# Patient Record
Sex: FEMALE | Race: WHITE | Employment: FULL TIME | URBAN - METROPOLITAN AREA
[De-identification: names, ages, dates, MRNs, and addresses within clinical notes are randomized per-mention and may not be internally consistent; named-entity substitution may affect disease eponyms.]

---

## 2019-05-13 ENCOUNTER — OFFICE VISIT (OUTPATIENT)
Dept: ORTHOPEDIC SURGERY | Age: 34
End: 2019-05-13
Payer: COMMERCIAL

## 2019-05-13 VITALS — WEIGHT: 185 LBS | HEIGHT: 66 IN | BODY MASS INDEX: 29.73 KG/M2

## 2019-05-13 DIAGNOSIS — M25.561 ACUTE PAIN OF RIGHT KNEE: ICD-10-CM

## 2019-05-13 DIAGNOSIS — M25.562 ACUTE PAIN OF BOTH KNEES: Primary | ICD-10-CM

## 2019-05-13 DIAGNOSIS — M25.561 ACUTE PAIN OF BOTH KNEES: Primary | ICD-10-CM

## 2019-05-13 PROCEDURE — 99204 OFFICE O/P NEW MOD 45 MIN: CPT | Performed by: ORTHOPAEDIC SURGERY

## 2019-05-13 NOTE — PROGRESS NOTES
5/13/19  History of Present Illness:                             Naa Wright is a 35 y.o. female 1st time evaluation bilateral knee pain right worse than left      Chief complaint presented in the office today: Bilateral knee pain right greater than left    Location bilateral knees  Severity moderate at least 6 out of 10  Duration more than a year  Associated sign/symptoms pain, catching, locking,    I have reviewed and discussed the below Pain assessment findings with the patient. Medical History  Patient's medications, allergies, past medical, surgical, social and family histories were reviewed and updated as appropriate. History reviewed. No pertinent past medical history. History reviewed. No pertinent family history.   Social History     Socioeconomic History    Marital status: None     Spouse name: None    Number of children: None    Years of education: None    Highest education level: None   Occupational History    None   Social Needs    Financial resource strain: None    Food insecurity:     Worry: None     Inability: None    Transportation needs:     Medical: None     Non-medical: None   Tobacco Use    Smoking status: Never Smoker    Smokeless tobacco: Never Used   Substance and Sexual Activity    Alcohol use: None    Drug use: None    Sexual activity: None   Lifestyle    Physical activity:     Days per week: None     Minutes per session: None    Stress: None   Relationships    Social connections:     Talks on phone: None     Gets together: None     Attends [employer] service: None     Active member of club or organization: None     Attends meetings of clubs or organizations: None     Relationship status: None    Intimate partner violence:     Fear of current or ex partner: None     Emotionally abused: None     Physically abused: None     Forced sexual activity: None   Other Topics Concern    None   Social History Narrative    None     No current outpatient medications on file. No current facility-administered medications for this visit. No Known Allergies    REVIEW OF SYSTEMS:   No acute rash  No numbness  No tingling  No fevers  No depression    Pertinent items are noted in HPI  Review of systems reviewed from Patient History Form dated on 5/13/19 and available in the patient's chart under the Media tab. Examination:  General Exam:    Vitals: Height 5' 6\" (1.676 m), weight 185 lb (83.9 kg). Constitutional: Patient is adequately groomed with no evidence of malnutrition  Mental Status: The patient is oriented to time, place and person. The patient's mood and affect are appropriate. Lymphatic: The lymphatic examination bilaterally reveals all areas to be without enlargement or induration. Vascular: Examination reveals no swelling or calf tenderness. Peripheral pulses are palpable and 2+. Neurological: The patient has good coordination. There is no weakness or sensory deficit. Skin:    Head/Neck: inspection reveals no rashes, ulcerations or lesions. Trunk:  inspection reveals no rashes, ulcerations or lesions. Right Lower Extremity: inspection reveals no rashes, ulcerations or lesions. Left Lower Extremity: inspection reveals no rashes, ulcerations or lesions. PHYSICAL EXAM:      Knee Examination  Inspection:  No abrasions no lacerations no signs of infection or obvious deformity no obvious  swelling and joint effusion     Palpation:   Palpation reveals moderate  Pain along the medial joint line,   No lateral pain along the joint line ,  mild joint effusion noted today.     Range of Motion:  0 - 150° flexion/ extension   Hip extension to 20 hip flexion to 70+  Lumbar ROM -20 extension flexion to 6 inches from the floor      Strength: Quadriceps testing 5/5 , hamstring muscle testing 5/5, EHL against resistance is 5/5, hip flexor strength is intact 5/5, internal and external rotation of the hip against resistance is also intact 5/5    Special Tests: stable Lachman, negative anterior drawer, negative pivot shift, no posterior sag no posterior drawer does not open to valgus or varus stress at 0 or 30° positive, Steinmann's positive, Gil's negative, Homans negative Oumar, pedal pulses are +2/4 capillary refill is brisk sensation is intact ankle exam and hip exam are shows no pain with full range of motion provocative testing of the hip is nonpainful muscle testing around the hip is 5 over 5. Lumbar flexion to 6 inches from floor with out pain    Gait: antalgic     Reflex:    Lower extremity Deep tendon reflexes +2/4 and equal bilaterally for patella and Achilles  Upper extremity reflexes:  of the biceps, triceps, brachioradialis +2/4 equal bilaterally    Contralateral  Knee: Negative Lachman negative anterior drawer negative pivot shift no posterior sag no posterior drawer does not open to valgus or varus stress at 0 or 30° negative Steinmann's negative Gil's negative Homans negative Oumar pedal pulses are +2/4 capillary refill is brisk sensation is intact ankle exam and hip exam are shows no pain with full range of motion provocative testing of the hip is nonpainful muscle testing around the hip is 5 over 5. Lumbar exam:    L1: good strength of the iliopsoas muscle upper lateral thigh sensation is intact  L2: good strength of the iliopsoas muscle and medial epicondyle sensation is intact Lateral thigh sensation is intact  L3: good strength with out pain of obturator externus muscle sensation is intact to medial epicondyle of the femur   L4:Good quadratus strength and gluteus medius and minimus strength, sensation is intact to medial malleolus  L5:Intact Extensor hallucis and tibialis posterior strength, sensation is intact to dorsum of the foot.    S1:  Plantar foot sensation is intact  S2:  Posterior thigh and calf sensation is intact      Hip and lumbar testing does not reproduce pain provocative testing does not reproduce the symptomatology. Additional Examinations:  Right Upper Extremity:  Examination of the right upper extremity does not show any tenderness, deformity or injury. Range of motion is unremarkable. There is no gross instability. There are no rashes, ulcerations or lesions. Strength and tone are normal.  Left Upper Extremity: Examination of the left upper extremity does not show any tenderness, deformity or injury. Range of motion is unremarkable. There is no gross instability. There are no rashes, ulcerations or lesions. Strength and tone are normal.  Lower Back: Examination of the lower back does not show any tenderness, deformity or injury. Range of motion is unremarkable. There is no gross instability. There are no rashes, ulcerations or lesions. Strength and tone are normal.          IMPRESSION:    Diagnostic testing:  X-rays reviewed in office, I independently reviewed the films in the office today:  I reviewed multiple X-rays of the bilateral knee today, anterior posterior, lateral, notch view, skyline view:  Show no fracture no dislocation no signs of any significant arthritic wear, good joint space maintenance, no masses or tumors, no signs of any significant osteopenia, no obvious OCD lesions, no loose bodies seen. Impression no fracture no dislocation no signs of any significant articular wear  MRI: Ordered a right knee MRI to rule out meniscus tear today  Labs: None ordered      History reviewed. No pertinent surgical history. .    Office Procedures:  Orders Placed This Encounter   Procedures    XR KNEE RIGHT (MIN 4 VIEWS)     Standing Status:   Future     Number of Occurrences:   1     Standing Expiration Date:   5/13/2020    XR KNEE LEFT (MIN 4 VIEWS)     Standing Status:   Future     Number of Occurrences:   1     Standing Expiration Date:   5/13/2020       Previous Treatments:  Ice, rest, physical therapy, injections,, X-ray,

## 2019-05-20 ENCOUNTER — OFFICE VISIT (OUTPATIENT)
Dept: ORTHOPEDIC SURGERY | Age: 34
End: 2019-05-20
Payer: COMMERCIAL

## 2019-05-20 VITALS — WEIGHT: 185 LBS | HEIGHT: 66 IN | BODY MASS INDEX: 29.73 KG/M2

## 2019-05-20 DIAGNOSIS — M25.561 ACUTE PAIN OF RIGHT KNEE: Primary | ICD-10-CM

## 2019-05-20 PROCEDURE — 99214 OFFICE O/P EST MOD 30 MIN: CPT | Performed by: ORTHOPAEDIC SURGERY

## 2019-05-20 RX ORDER — PREDNISONE 10 MG/1
TABLET ORAL
Qty: 30 TABLET | Refills: 0 | Status: SHIPPED | OUTPATIENT
Start: 2019-05-20

## 2019-05-20 NOTE — PROGRESS NOTES
5/20/19  History of Present Illness:  Ulises Horan is a 35 y.o. female    Chief complaint today in the office:  Recheck evaluation right knee here today to discuss options    Location right Knee   Severity moderate  Duration several months  Associated sign/symptoms pain, swelling, trouble walking without pain    Medical History  Patient's medications, allergies, past medical, surgical, social and family histories were reviewed and updated as appropriate. Review of Systems  No new rashes  No numbness  No tingling  No fever  No depression  No new pain pattern  Pertinent items are noted in HPI  Review of systems reviewed from Patient History Form dated on 5/13/19 and available in the patient's chart under the Media tab. No change in the patients medical history form. Examination:  General Exam:    Vitals: Height 5' 6\" (1.676 m), weight 185 lb (83.9 kg). Constitutional: Patient is adequately groomed with no evidence of malnutrition  Mental Status: The patient is alert and  oriented to time, place and person. The patient's mood and affect are appropriate. Lymphatic: The lymphatic examination bilaterally reveals all areas to be without enlargement or induration. Vascular: Examination reveals no swelling or calf tenderness. Peripheral pulses are palpable and 2+. Neurological: The patient has good coordination. There is no weakness or sensory deficit. Skin:    Head/Neck: inspection reveals no rashes, ulcerations or lesions. Trunk:  inspection reveals no rashes, ulcerations or lesions. Right Lower Extremity: inspection reveals no rashes, ulcerations or lesions. Left Lower Extremity: inspection reveals no rashes, ulcerations or lesions.                                           PHYSICAL EXAM:        Knee Examination  Inspection:  No abrasions no lacerations no signs of infection or obvious deformity no obvious  swelling and joint effusion     Palpation:   Palpation reveals no  Pain medial joint line,   Moderate lateral joint line pain,  mild joint effusion    Range of Motion:  0 - 150° flexion/ extension   Hip extension to 20 hip flexion to 70+  Lumbar ROM -20 extension flexion to 6 inches from the floor      Strength: Quadriceps testing 5/5 , hamstring muscle testing 5/5, EHL against resistance is 5/5, hip flexor strength is intact 5/5, internal and external rotation of the hip against resistance is also intact 5/5    Special Tests: stable Lachman, negative anterior drawer, negative pivot shift, no posterior sag no posterior drawer does not open to valgus or varus stress at 0 or 30° negative, Steinmann's negative, Gil's negative, Homans negative Oumar, pedal pulses are +2/4 capillary refill is brisk sensation is intact ankle exam and hip exam are shows no pain with full range of motion provocative testing of the hip is nonpainful muscle testing around the hip is 5 over 5. Lumbar flexion to 6 inches from floor with out pain lateral retinacular crepitus with lateral retinacular tightness      Inspection:      Gait: antalgic     Reflex:    Lower extremity Deep tendon reflexes +2/4 and equal bilaterally for patella and Achilles  Upper extremity reflexes:  of the biceps, triceps, brachioradialis +2/4 equal bilaterally    Contralateral  Knee: Negative Lachman negative anterior drawer negative pivot shift no posterior sag no posterior drawer does not open to valgus or varus stress at 0 or 30° negative Steinmann's negative Gil's negative Homans negative Oumar pedal pulses are +2/4 capillary refill is brisk sensation is intact ankle exam and hip exam are shows no pain with full range of motion provocative testing of the hip is nonpainful muscle testing around the hip is 5 over 5. Hip and lumbar testing does not reproduce pain evocative testing does not reproduce symptomatology.           Additional Examinations:  Left Lower Extremity: Examination of the left lower extremity does not show any tenderness, deformity or injury. Range of motion is unremarkable. There is no gross instability. There are no rashes, ulcerations or lesions. Strength and tone are normal.  Right Upper Extremity:  Examination of the right upper extremity does not show any tenderness, deformity or injury. Range of motion is unremarkable. There is no gross instability. There are no rashes, ulcerations or lesions. Strength and tone are normal.  Left Upper Extremity: Examination of the left upper extremity does not show any tenderness, deformity or injury. Range of motion is unremarkable. There is no gross instability. There are no rashes, ulcerations or lesions. Strength and tone are normal.  Lower Back: Examination of the lower back does not show any tenderness, deformity or injury. Range of motion is unremarkable. There is no gross instability. There are no rashes, ulcerations or lesions. Strength and tone are normal.    History reviewed. No pertinent surgical history. .    Radiology:     X-rays reviewed in office:  I independently reviewed the films in the office today. Views right multiple views  Body Part right knee  Impression right knee lateral facet patella ulcer      Assessment :  Right knee lateral facet patella ulcer    Impression: Same    Office Procedures:  No orders of the defined types were placed in this encounter. Previous Treatments:  X-ray, MRI,    Possible other diagnoses:      Treatment Plan:  Prednisone, physical therapy, follow-up if no better we'll consider injection or a lateral retinacular release      Tate Benitez. SKIP Mahoney. [address] [place] [address] 20 and Sports Medicine  Sports Fellowship Trained  Board Certified  [facility] Team Physician        Disclaimer: \"This note was dictated with voice recognition software. Though review and correction are routine, we apologize for any errors. \"

## 2019-05-29 ENCOUNTER — HOSPITAL ENCOUNTER (OUTPATIENT)
Dept: PHYSICAL THERAPY | Age: 34
Setting detail: THERAPIES SERIES
Discharge: HOME | End: 2019-05-29
Payer: COMMERCIAL

## 2019-05-29 PROCEDURE — 97161 PT EVAL LOW COMPLEX 20 MIN: CPT

## 2019-05-29 PROCEDURE — 97110 THERAPEUTIC EXERCISES: CPT

## 2019-05-29 NOTE — FLOWSHEET NOTE
[facility]    Physical Therapy Daily Treatment Note  Date:  2019    Patient Name:  Julius Rios    :  1985  MRN: [id number]  Restrictions/Precautions:    Medical/Treatment Diagnosis Information:  Diagnosis: M25.561 (R knee pain)   Treatment Diagnosis: R knee pain; L knee pain   Insurance/Certification information:  PT Insurance Information: [employer]/3000 deductible/60 visits  Physician Information:  Referring Practitioner: Dr. Nicholson Blocker of care signed (Y/N):     Date of Patient follow up with Physician:     G-Code (if applicable):      Date G-Code Applied:     LEFS 21%    Progress Note: [x]  Yes  []  No  Next due by: Visit #10       Latex Allergy:  [x]NO      []YES  Preferred Language for Healthcare:   [x]English       []other:    Visit # Insurance Allowable   1 60     Pain level:  8/10 at its worst     SUBJECTIVE:  See eval    OBJECTIVE: See eval  Observation:   Test measurements:      RESTRICTIONS/PRECAUTIONS: B knee pain     Exercises/Interventions:     Therapeutic Ex Resistance Sets/sec Reps Notes   Retro Stepper/BIKE       Quad sets  x25     3 way SLR Flex/abd 2 10 B   Bridges  2 10 5 sec hold   Clam ABD       Hip Ext /table       Bosu fwd/side lunge       Slide Lunge       Leg Press Ecc 0-       Cybex HS curl       TKE       Glute side walks       BOSU squat                            Manual Intervention       Knee mobs/PROM       Tib/Fem Mobs       Patella Mobs       Ankle mobs                     NMR re-education       British/Biofeedback 10/10       G. Med activaiton/sidelying       G. Max Activation/prone       Hip Ext full ROM G.  Activation       Bosu Bal and Prop- G Med       Single leg stance/Balance/Prop       Bosu Retro G. Med act                         Therapeutic Exercise and NMR EXR  [x] (64701) Provided verbal/tactile cueing for activities related to strengthening, flexibility, endurance, ROM for improvements Total Treatment Minutes: 40 min   Restricted benefits (high deductible)   [x] EVAL  [x] ZW(41503) x  1   [] IONTO  [] NMR (84139) x      [] VASO  [] Manual (05189) x       [] Other:  [] TA x       [] Mech Traction (51661)  [] ES(attended) (50741)      [] ES (un) (44015):     GOALS:   Patient stated goal: \"no pain\" \"unrestricted hiking\"     Therapist goals for Patient:   Short Term Goals: To be achieved in: 2 weeks  1. Independent in HEP and progression per patient tolerance, in order to prevent re-injury. 2. Patient will have a decrease in pain to facilitate improvement in movement, function, and ADLs as indicated by Functional Deficits. Long Term Goals: To be achieved in: 8 weeks  1. Disability index score of 15% or less for the LEFS to assist with reaching prior level of function. 2. Patient will demonstrate an increase in Strength to good proximal hip strength and control, within 5lb HHD in LE to allow for proper functional mobility as indicated by patients Functional Deficits. 3. Patient will return to all functional activities without increased symptoms or restriction. 4. Pt will squat pain free to perform work duties. Progression Towards Functional goals:  [] Patient is progressing as expected towards functional goals listed. [] Progression is slowed due to complexities listed. [] Progression has been slowed due to co-morbidities. [x] Plan just implemented, too soon to assess goals progression  [] Other:     ASSESSMENT:  See eval    Treatment/Activity Tolerance:  [x] Patient tolerated treatment well [] Patient limited by fatique  [] Patient limited by pain  [] Patient limited by other medical complications  [] Other:     Prognosis: [x] Good [] Fair  [] Poor    Patient Requires Follow-up: [x] Yes  [] No    PLAN: See eval  [] Continue per plan of care [] Alter current plan (see comments)  [x] Plan of care initiated [] Hold pending MD visit [] Discharge    Electronically signed by:  See ARCHIE Sauceda PT

## 2019-05-29 NOTE — PLAN OF CARE
[facility]  [address] [place] [address]  Phone [phone]   Fax [phone]                                                       Physical Therapy Certification    Dear Referring Practitioner: Dr. Karo Chang,    We had the pleasure of evaluating the following patient for physical therapy services at [address] [place] [address]. A summary of our findings can be found in the initial assessment below. This includes our plan of care. If you have any questions or concerns regarding these findings, please do not hesitate to contact me at the office phone number checked above. Thank you for the referral.       Physician Signature:_______________________________Date:__________________  By signing above (or electronic signature), therapists plan is approved by physician      Patient: Heike Her   : 1985   MRN: [id number]  Referring Physician: Referring Practitioner: Dr. Karo Chang      Evaluation Date: 2019      Medical Diagnosis Information:  Diagnosis: M25.561 (R knee pain)    Treatment Diagnosis: R knee pain; L knee pain                                          Insurance information: PT Insurance Information: [employer]/3000 deductible/60 visits     Precautions/ Contra-indications: None   Latex Allergy:  [x]NO      []YES  Preferred Language for Healthcare:   [x]English       []other:    SUBJECTIVE: Patient stated complaint: Years of knee pain, that has continually gotten worse. In the last 3 months was unable to walk on the weekends after walking all week. Saw Dr. Arden Almendarez, Prednisone made symptoms worse. MRI results unknown by patient. Referred here for therapy. NOW pain with squatting, prolonged walking/standing.    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)      Living Status/Prior Level of Function: Independent with ADLs and IADLs    OBJECTIVE:     ROM LEFT RIGHT   Knee ext     Knee Flex 130 70   Strength  LEFT RIGHT   HIP Flexors 4 4   HIP Abductors 4 4   HIP Ext     Hip ER     Knee EXT (quad) 4 4   Knee Flex (HS)     Ankle DF     Ankle PF     Ankle Inv     Ankle EV          Circumference  Mid apex  7 cm prox             Reflexes/Sensation:    [x]Dermatomes/Myotomes intact    [x]Reflexes equal and normal bilaterally   []Other:    Joint mobility:    [x]Normal    []Hypo   []Hyper    Palpation: moderate tenderness to palpation R>L VLAT, patellar tendon    Functional Mobility/Transfers: WNL    Posture: WNL    Bandages/Dressings/Incisions: NA    Gait: (include devices/WB status) WNL    Orthopedic Special Tests: valgus/varus neg, anterior drawer neg                        [x] Patient history, allergies, meds reviewed. Medical chart reviewed. See intake form. Review Of Systems (ROS):  [x]Performed Review of systems (Integumentary, CardioPulmonary, Neurological) by intake and observation. Intake form has been scanned into medical record. Patient has been instructed to contact their primary care physician regarding ROS issues if not already being addressed at this time.       Co-morbidities/Complexities (which will affect course of rehabilitation):   [x]None           Arthritic conditions   []Rheumatoid arthritis (M05.9)  []Osteoarthritis (M19.91)   Cardiovascular conditions   []Hypertension (I10)  []Hyperlipidemia (E78.5)  []Angina pectoris (I20)  []Atherosclerosis (I70)   Musculoskeletal conditions   []Disc pathology   []Congenital spine pathologies   []Prior surgical intervention  []Osteoporosis (M81.8)  []Osteopenia (M85.8) prolonged functional periods/distances/surfaces   [x]Reduced ability to ascend/descend stairs   [x]Reduced ability to run, hop, cut or jump   []other:    Participation Restrictions   []Reduced participation in self care activities   []Reduced participation in home management activities   [x]Reduced participation in work activities   [x]Reduced participation in social activities. [x]Reduced participation in sport/recreation activities. Classification :    []Signs/symptoms consistent with post-surgical status including decreased ROM, strength and function.    []Signs/symptoms consistent with joint sprain/strain   []Signs/symptoms consistent with patella-femoral syndrome   []Signs/symptoms consistent with knee OA/hip OA   []Signs/symptoms consistent with internal derangement of knee/Hip   [x]Signs/symptoms consistent with functional hip weakness/NMR control      [x]Signs/symptoms consistent with tendinitis/tendinosis    []signs/symptoms consistent with pathology which may benefit from Dry needling      []other:      Prognosis/Rehab Potential:      []Excellent   [x]Good    []Fair   []Poor    Tolerance of evaluation/treatment:    []Excellent   [x]Good    []Fair   []Poor    Physical Therapy Evaluation Complexity Justification  [] A history of present problem with:  [] no personal factors and/or comorbidities that impact the plan of care;  [x]1-2 personal factors and/or comorbidities that impact the plan of care  []3 personal factors and/or comorbidities that impact the plan of care  [] An examination of body systems using standardized tests and measures addressing any of the following: body structures and functions (impairments), activity limitations, and/or participation restrictions;:  [x] a total of 1-2 or more elements   [] a total of 3 or more elements   [] a total of 4 or more elements   [] A clinical presentation with:  [x] stable and/or uncomplicated characteristics   [] evolving clinical presentation with changing characteristics  [] unstable and unpredictable characteristics;   [] Clinical decision making of [x] low, [] moderate, [] high complexity using standardized patient assessment instrument and/or measurable assessment of functional outcome. [x] EVAL (LOW) 85987 (typically 30 minutes face-to-face)  [] EVAL (MOD) 44123 (typically 30 minutes face-to-face)  [] EVAL (HIGH) 20733 (typically 45 minutes face-to-face)  [] RE-EVAL     PLAN:   Frequency/Duration:  1-2 days per week for 6-8 Weeks:  Interventions:  [x]  Therapeutic exercise including: strength training, ROM, for Lower extremity and core   [x]  NMR activation and proprioception for LE, Glutes and Core   [x]  Manual therapy as indicated for LE, Hip and spine to include: Dry Needling/IASTM, STM, PROM, Gr I-IV mobilizations, manipulation. [x] Modalities as needed that may include: thermal agents, E-stim, Biofeedback, US, iontophoresis as indicated  [x] Patient education on joint protection, postural re-education, activity modification, progression of HEP. HEP instruction: (see scanned forms)    GOALS:  Patient stated goal: \"no pain\" \"unrestricted hiking\"     Therapist goals for Patient:   Short Term Goals: To be achieved in: 2 weeks  1. Independent in HEP and progression per patient tolerance, in order to prevent re-injury. 2. Patient will have a decrease in pain to facilitate improvement in movement, function, and ADLs as indicated by Functional Deficits. Long Term Goals: To be achieved in: 8 weeks  1. Disability index score of 15% or less for the LEFS to assist with reaching prior level of function. 2. Patient will demonstrate an increase in Strength to good proximal hip strength and control, within 5lb HHD in LE to allow for proper functional mobility as indicated by patients Functional Deficits. 3. Patient will return to all functional activities without increased symptoms or restriction.    4. Pt will squat pain free to perform work duties. Electronically signed by:   Devi Beavers, PT

## 2019-06-11 ENCOUNTER — HOSPITAL ENCOUNTER (OUTPATIENT)
Dept: PHYSICAL THERAPY | Age: 34
Setting detail: THERAPIES SERIES
Discharge: HOME | End: 2019-06-11
Payer: COMMERCIAL

## 2019-06-11 PROCEDURE — 97110 THERAPEUTIC EXERCISES: CPT

## 2019-06-11 NOTE — FLOWSHEET NOTE
[facility]    Physical Therapy Daily Treatment Note  Date:  2019    Patient Name:  Naa Wright    :  1985  MRN: [id number]  Restrictions/Precautions:    Medical/Treatment Diagnosis Information:  Diagnosis: M25.561 (R knee pain)   Treatment Diagnosis: R knee pain; L knee pain   Insurance/Certification information:  PT Insurance Information: [employer]/3000 deductible/60 visits  Physician Information:  Referring Practitioner: Dr. Pura Kan of care signed (Y/N):     Date of Patient follow up with Physician:     G-Code (if applicable):      Date G-Code Applied:     LEFS 21%    Progress Note: [x]  Yes  []  No  Next due by: Visit #10       Latex Allergy:  [x]NO      []YES  Preferred Language for Healthcare:   [x]English       []other:    Visit # Insurance Allowable   1 60     Pain level:  8/10 at its worst     SUBJECTIVE:  See eval    OBJECTIVE: See eval  Observation:   Test measurements:      RESTRICTIONS/PRECAUTIONS: B knee pain     Exercises/Interventions:     Therapeutic Ex Resistance Sets/sec Reps Notes   Retro Stepper/BIKE       Quad sets  x25     3 way SLR Flex/abd 2 10 B   Bridges  2 10 5 sec hold   Clam ABD  2 15 B   Hip Ext /table       Bosu fwd/side lunge       Slide Lunge       Leg Press Ecc 0- 65# 2 10    Cybex HS curl       TKE       Glute side walks orange 2 10    BOSU squat                            Manual Intervention       Knee mobs/PROM       Tib/Fem Mobs       Patella Mobs       Ankle mobs                     NMR re-education       Mozambican/Biofeedback 10/10       G. Med activaiton/sidelying       G. Max Activation/prone       Hip Ext full ROM G.  Activation       Bosu Bal and Prop- G Med       Single leg stance/Balance/Prop  3 30s Tandem series   Bosu Retro G. Med act                         Therapeutic Exercise and NMR EXR  [x] (69451) Provided verbal/tactile cueing for activities related to strengthening, flexibility, endurance, ROM for improvements in LE, proximal hip, and core control with self care, mobility, lifting, ambulation.  [] (23419) Provided verbal/tactile cueing for activities related to improving balance, coordination, kinesthetic sense, posture, motor skill, proprioception  to assist with LE, proximal hip, and core control in self care, mobility, lifting, ambulation and eccentric single leg control. NMR and Therapeutic Activities:    [] (95405 or 14791) Provided verbal/tactile cueing for activities related to improving balance, coordination, kinesthetic sense, posture, motor skill, proprioception and motor activation to allow for proper function of core, proximal hip and LE with self care and ADLs  [] (21151) Gait Re-education- Provided training and instruction to the patient for proper LE, core and proximal hip recruitment and positioning and eccentric body weight control with ambulation re-education including up and down stairs     Home Exercise Program:    [x] (75291) Reviewed/Progressed HEP activities related to strengthening, flexibility, endurance, ROM of core, proximal hip and LE for functional self-care, mobility, lifting and ambulation/stair navigation   [] (96896)Reviewed/Progressed HEP activities related to improving balance, coordination, kinesthetic sense, posture, motor skill, proprioception of core, proximal hip and LE for self care, mobility, lifting, and ambulation/stair navigation      Manual Treatments:  PROM / STM / Oscillations-Mobs:  G-I, II, III, IV (PA's, Inf., Post.)  [x] (73754) Provided manual therapy to mobilize LE, proximal hip and/or LS spine soft tissue/joints for the purpose of modulating pain, promoting relaxation,  increasing ROM, reducing/eliminating soft tissue swelling/inflammation/restriction, improving soft tissue extensibility and allowing for proper ROM for normal function with self care, mobility, lifting and ambulation.      Modalities:      Charges:  Timed Code Treatment Minutes: 35 min   Total Treatment Minutes: 40 min   Restricted benefits (high deductible)   [] EVAL  [x] OH(88358) x  2   [] IONTO  [] NMR (04780) x      [] VASO  [] Manual (52525) x       [] Other:  [] TA x       [] Mech Traction (96290)  [] ES(attended) (40438)      [] ES (un) (88031):     GOALS:   Patient stated goal: \"no pain\" \"unrestricted hiking\"     Therapist goals for Patient:   Short Term Goals: To be achieved in: 2 weeks  1. Independent in HEP and progression per patient tolerance, in order to prevent re-injury. 2. Patient will have a decrease in pain to facilitate improvement in movement, function, and ADLs as indicated by Functional Deficits. Long Term Goals: To be achieved in: 8 weeks  1. Disability index score of 15% or less for the LEFS to assist with reaching prior level of function. 2. Patient will demonstrate an increase in Strength to good proximal hip strength and control, within 5lb HHD in LE to allow for proper functional mobility as indicated by patients Functional Deficits. 3. Patient will return to all functional activities without increased symptoms or restriction. 4. Pt will squat pain free to perform work duties. Progression Towards Functional goals:  [] Patient is progressing as expected towards functional goals listed. [] Progression is slowed due to complexities listed. [] Progression has been slowed due to co-morbidities. [x] Plan just implemented, too soon to assess goals progression  [] Other:     ASSESSMENT:  Pt demonstrates no tenderness to palpation.      Treatment/Activity Tolerance:  [x] Patient tolerated treatment well [] Patient limited by fatique  [] Patient limited by pain  [] Patient limited by other medical complications  [] Other:     Prognosis: [x] Good [] Fair  [] Poor    Patient Requires Follow-up: [x] Yes  [] No    PLAN: Progress LE strength as tolerated   [] Continue per plan of care [] Alter current plan (see comments)  [x] Plan of care initiated [] Hold pending MD visit [] Discharge    Electronically signed by:  See Sauceda PT

## 2019-06-25 ENCOUNTER — HOSPITAL ENCOUNTER (OUTPATIENT)
Dept: PHYSICAL THERAPY | Age: 34
Setting detail: THERAPIES SERIES
Discharge: HOME | End: 2019-06-25
Payer: COMMERCIAL

## 2019-07-01 ENCOUNTER — OFFICE VISIT (OUTPATIENT)
Dept: ORTHOPEDIC SURGERY | Age: 34
End: 2019-07-01
Payer: COMMERCIAL

## 2019-07-01 VITALS — WEIGHT: 184.97 LBS | BODY MASS INDEX: 29.73 KG/M2 | HEIGHT: 66 IN

## 2019-07-01 DIAGNOSIS — M25.561 ACUTE PAIN OF RIGHT KNEE: Primary | ICD-10-CM

## 2019-07-01 PROCEDURE — 99214 OFFICE O/P EST MOD 30 MIN: CPT | Performed by: ORTHOPAEDIC SURGERY

## 2019-07-01 NOTE — PROGRESS NOTES
7/1/19  History of Present Illness:  Nicolasa Morales is a 35 y.o. female    Chief complaint today in the office: Recheck evaluation right knee still significantly painful therapy may have made it a little bit worse    Location right knee   Severity moderate to severe  Duration more than 6 months now  Associated sign/symptoms pain, swelling, loss of motion, pain with all activities    Medical History  Patient's medications, allergies, past medical, surgical, social and family histories were reviewed and updated as appropriate. Review of Systems  No new rashes  No numbness  No tingling  No fever  No depression  No new pain pattern  Pertinent items are noted in HPI  Review of systems reviewed from Patient History Form dated on 7/1/2019 and available in the patient's chart under the Media tab. No change in the patients medical history form. Examination:  General Exam:    Vitals: Height 5' 5.98\" (1.676 m), weight 184 lb 15.5 oz (83.9 kg). Constitutional: Patient is adequately groomed with no evidence of malnutrition  Mental Status: The patient is alert and  oriented to time, place and person. The patient's mood and affect are appropriate. Lymphatic: The lymphatic examination bilaterally reveals all areas to be without enlargement or induration. Vascular: Examination reveals no swelling or calf tenderness. Peripheral pulses are palpable and 2+. Neurological: The patient has good coordination. There is no weakness or sensory deficit. Skin:    Head/Neck: inspection reveals no rashes, ulcerations or lesions. Trunk:  inspection reveals no rashes, ulcerations or lesions. Right Lower Extremity: inspection reveals no rashes, ulcerations or lesions. Left Lower Extremity: inspection reveals no rashes, ulcerations or lesions.                                           PHYSICAL EXAM:        Knee Examination  Inspection:  No abrasions no lacerations no signs of infection or obvious deformity moderate no concerns including cardiopulmonary issues, pulmonary issues, and even possibility of death or dystrophy. The patient voiced understanding to this as well as the normal  rehabilitation  that   is involved with weeks of physical therapy, exercise, and strengthening. The patient also realizes that even though the surgery, from a functional perspective, typically allows the patient to return to good function at about 6 weeks, that it often takes 6 months to completely rehabilitate from this operation. The patient also realizes that if there is an arthritic component to the symptoms, then they may still have some degree of arthritis pain. Juliann Mahoney D.O. [address] [place] [address] and Sports Medicine  Sports Fellowship Trained  Board Certified  [employer] Team Physician        Disclaimer: \"This note was dictated with voice recognition software. Though review and correction are routine, we apologize for any errors. \"

## 2019-07-08 DIAGNOSIS — M25.561 ACUTE PAIN OF RIGHT KNEE: Primary | ICD-10-CM

## 2019-07-08 RX ORDER — HYDROCODONE BITARTRATE AND ACETAMINOPHEN 5; 325 MG/1; MG/1
1 TABLET ORAL EVERY 6 HOURS PRN
Qty: 28 TABLET | Refills: 0 | Status: SHIPPED | OUTPATIENT
Start: 2019-07-12 | End: 2019-07-19

## 2019-07-08 RX ORDER — MELOXICAM 15 MG/1
15 TABLET ORAL DAILY
Qty: 90 TABLET | Refills: 3 | Status: SHIPPED | OUTPATIENT
Start: 2019-07-12

## 2019-07-09 ENCOUNTER — TELEPHONE (OUTPATIENT)
Dept: ORTHOPEDIC SURGERY | Age: 34
End: 2019-07-09

## 2019-07-10 ENCOUNTER — SURG/PROC ORDERS (OUTPATIENT)
Dept: ORTHOPEDIC SURGERY | Age: 34
End: 2019-07-10

## 2019-07-10 RX ORDER — DOCUSATE SODIUM 100 MG/1
100 CAPSULE, LIQUID FILLED ORAL 2 TIMES DAILY
Status: CANCELLED | OUTPATIENT
Start: 2019-07-10

## 2019-07-10 RX ORDER — ONDANSETRON 2 MG/ML
4 INJECTION INTRAMUSCULAR; INTRAVENOUS EVERY 6 HOURS PRN
Status: CANCELLED | OUTPATIENT
Start: 2019-07-10

## 2019-07-12 ENCOUNTER — TELEPHONE (OUTPATIENT)
Dept: ORTHOPEDIC SURGERY | Age: 34
End: 2019-07-12

## 2019-07-12 ENCOUNTER — ANESTHESIA (OUTPATIENT)
Dept: OPERATING ROOM | Age: 34
End: 2019-07-12
Payer: COMMERCIAL

## 2019-07-12 ENCOUNTER — HOSPITAL ENCOUNTER (OUTPATIENT)
Age: 34
Setting detail: OUTPATIENT SURGERY
Discharge: HOME | End: 2019-07-12
Attending: ORTHOPAEDIC SURGERY | Admitting: ORTHOPAEDIC SURGERY
Payer: COMMERCIAL

## 2019-07-12 ENCOUNTER — ANESTHESIA EVENT (OUTPATIENT)
Dept: OPERATING ROOM | Age: 34
End: 2019-07-12
Payer: COMMERCIAL

## 2019-07-12 VITALS
DIASTOLIC BLOOD PRESSURE: 55 MMHG | RESPIRATION RATE: 1 BRPM | OXYGEN SATURATION: 100 % | SYSTOLIC BLOOD PRESSURE: 96 MMHG

## 2019-07-12 VITALS
OXYGEN SATURATION: 100 % | SYSTOLIC BLOOD PRESSURE: 111 MMHG | TEMPERATURE: 98 F | RESPIRATION RATE: 16 BRPM | WEIGHT: 194.5 LBS | HEART RATE: 90 BPM | DIASTOLIC BLOOD PRESSURE: 81 MMHG | BODY MASS INDEX: 31.26 KG/M2 | HEIGHT: 66 IN

## 2019-07-12 LAB — HCG(URINE) PREGNANCY TEST: NEGATIVE

## 2019-07-12 PROCEDURE — 2580000003 HC RX 258: Performed by: NURSE ANESTHETIST, CERTIFIED REGISTERED

## 2019-07-12 PROCEDURE — 6360000002 HC RX W HCPCS: Performed by: ORTHOPAEDIC SURGERY

## 2019-07-12 PROCEDURE — 2720000010 HC SURG SUPPLY STERILE: Performed by: ORTHOPAEDIC SURGERY

## 2019-07-12 PROCEDURE — 2500000003 HC RX 250 WO HCPCS: Performed by: ORTHOPAEDIC SURGERY

## 2019-07-12 PROCEDURE — 84703 CHORIONIC GONADOTROPIN ASSAY: CPT

## 2019-07-12 PROCEDURE — 2580000003 HC RX 258: Performed by: ANESTHESIOLOGY

## 2019-07-12 PROCEDURE — 2709999900 HC NON-CHARGEABLE SUPPLY: Performed by: ORTHOPAEDIC SURGERY

## 2019-07-12 PROCEDURE — 6360000002 HC RX W HCPCS: Performed by: NURSE ANESTHETIST, CERTIFIED REGISTERED

## 2019-07-12 PROCEDURE — 3600000014 HC SURGERY LEVEL 4 ADDTL 15MIN: Performed by: ORTHOPAEDIC SURGERY

## 2019-07-12 PROCEDURE — 7100000010 HC PHASE II RECOVERY - FIRST 15 MIN: Performed by: ORTHOPAEDIC SURGERY

## 2019-07-12 PROCEDURE — 3600000004 HC SURGERY LEVEL 4 BASE: Performed by: ORTHOPAEDIC SURGERY

## 2019-07-12 PROCEDURE — 3700000001 HC ADD 15 MINUTES (ANESTHESIA): Performed by: ORTHOPAEDIC SURGERY

## 2019-07-12 PROCEDURE — 2580000003 HC RX 258: Performed by: ORTHOPAEDIC SURGERY

## 2019-07-12 PROCEDURE — 3700000000 HC ANESTHESIA ATTENDED CARE: Performed by: ORTHOPAEDIC SURGERY

## 2019-07-12 PROCEDURE — 7100000011 HC PHASE II RECOVERY - ADDTL 15 MIN: Performed by: ORTHOPAEDIC SURGERY

## 2019-07-12 RX ORDER — SODIUM CHLORIDE 0.9 % (FLUSH) 0.9 %
10 SYRINGE (ML) INJECTION EVERY 12 HOURS SCHEDULED
Status: DISCONTINUED | OUTPATIENT
Start: 2019-07-12 | End: 2019-07-12 | Stop reason: HOSPADM

## 2019-07-12 RX ORDER — CEFAZOLIN SODIUM 2 G/100ML
2 INJECTION, SOLUTION INTRAVENOUS
Status: COMPLETED | OUTPATIENT
Start: 2019-07-12 | End: 2019-07-12

## 2019-07-12 RX ORDER — FENTANYL CITRATE 50 UG/ML
25 INJECTION, SOLUTION INTRAMUSCULAR; INTRAVENOUS EVERY 5 MIN PRN
Status: DISCONTINUED | OUTPATIENT
Start: 2019-07-12 | End: 2019-07-12 | Stop reason: HOSPADM

## 2019-07-12 RX ORDER — SODIUM CHLORIDE, SODIUM LACTATE, POTASSIUM CHLORIDE, CALCIUM CHLORIDE 600; 310; 30; 20 MG/100ML; MG/100ML; MG/100ML; MG/100ML
INJECTION, SOLUTION INTRAVENOUS CONTINUOUS
Status: DISCONTINUED | OUTPATIENT
Start: 2019-07-12 | End: 2019-07-12 | Stop reason: HOSPADM

## 2019-07-12 RX ORDER — PROPOFOL 10 MG/ML
INJECTION, EMULSION INTRAVENOUS PRN
Status: DISCONTINUED | OUTPATIENT
Start: 2019-07-12 | End: 2019-07-12 | Stop reason: SDUPTHER

## 2019-07-12 RX ORDER — HYDRALAZINE HYDROCHLORIDE 20 MG/ML
5 INJECTION INTRAMUSCULAR; INTRAVENOUS EVERY 10 MIN PRN
Status: DISCONTINUED | OUTPATIENT
Start: 2019-07-12 | End: 2019-07-12 | Stop reason: HOSPADM

## 2019-07-12 RX ORDER — SODIUM CHLORIDE 0.9 % (FLUSH) 0.9 %
10 SYRINGE (ML) INJECTION PRN
Status: DISCONTINUED | OUTPATIENT
Start: 2019-07-12 | End: 2019-07-12 | Stop reason: HOSPADM

## 2019-07-12 RX ORDER — MIDAZOLAM HYDROCHLORIDE 1 MG/ML
INJECTION INTRAMUSCULAR; INTRAVENOUS PRN
Status: DISCONTINUED | OUTPATIENT
Start: 2019-07-12 | End: 2019-07-12 | Stop reason: SDUPTHER

## 2019-07-12 RX ORDER — PROCHLORPERAZINE EDISYLATE 5 MG/ML
5 INJECTION INTRAMUSCULAR; INTRAVENOUS
Status: DISCONTINUED | OUTPATIENT
Start: 2019-07-12 | End: 2019-07-12 | Stop reason: HOSPADM

## 2019-07-12 RX ORDER — FENTANYL CITRATE 50 UG/ML
INJECTION, SOLUTION INTRAMUSCULAR; INTRAVENOUS PRN
Status: DISCONTINUED | OUTPATIENT
Start: 2019-07-12 | End: 2019-07-12 | Stop reason: SDUPTHER

## 2019-07-12 RX ORDER — PROPOFOL 10 MG/ML
INJECTION, EMULSION INTRAVENOUS CONTINUOUS PRN
Status: DISCONTINUED | OUTPATIENT
Start: 2019-07-12 | End: 2019-07-12 | Stop reason: SDUPTHER

## 2019-07-12 RX ORDER — SODIUM CHLORIDE 9 MG/ML
INJECTION, SOLUTION INTRAVENOUS CONTINUOUS
Status: DISCONTINUED | OUTPATIENT
Start: 2019-07-12 | End: 2019-07-12 | Stop reason: HOSPADM

## 2019-07-12 RX ORDER — LABETALOL HYDROCHLORIDE 5 MG/ML
5 INJECTION, SOLUTION INTRAVENOUS EVERY 10 MIN PRN
Status: DISCONTINUED | OUTPATIENT
Start: 2019-07-12 | End: 2019-07-12 | Stop reason: HOSPADM

## 2019-07-12 RX ORDER — SODIUM CHLORIDE 9 MG/ML
INJECTION, SOLUTION INTRAVENOUS CONTINUOUS PRN
Status: DISCONTINUED | OUTPATIENT
Start: 2019-07-12 | End: 2019-07-12 | Stop reason: SDUPTHER

## 2019-07-12 RX ORDER — ONDANSETRON 2 MG/ML
4 INJECTION INTRAMUSCULAR; INTRAVENOUS
Status: DISCONTINUED | OUTPATIENT
Start: 2019-07-12 | End: 2019-07-12 | Stop reason: HOSPADM

## 2019-07-12 RX ORDER — LIDOCAINE HYDROCHLORIDE 10 MG/ML
1 INJECTION, SOLUTION EPIDURAL; INFILTRATION; INTRACAUDAL; PERINEURAL
Status: DISCONTINUED | OUTPATIENT
Start: 2019-07-12 | End: 2019-07-12 | Stop reason: HOSPADM

## 2019-07-12 RX ORDER — OXYCODONE HYDROCHLORIDE AND ACETAMINOPHEN 5; 325 MG/1; MG/1
1 TABLET ORAL
Status: DISCONTINUED | OUTPATIENT
Start: 2019-07-12 | End: 2019-07-12 | Stop reason: HOSPADM

## 2019-07-12 RX ORDER — HYDROMORPHONE HCL 110MG/55ML
0.5 PATIENT CONTROLLED ANALGESIA SYRINGE INTRAVENOUS EVERY 5 MIN PRN
Status: DISCONTINUED | OUTPATIENT
Start: 2019-07-12 | End: 2019-07-12 | Stop reason: HOSPADM

## 2019-07-12 RX ADMIN — FENTANYL CITRATE 50 MCG: 50 INJECTION, SOLUTION INTRAMUSCULAR; INTRAVENOUS at 12:48

## 2019-07-12 RX ADMIN — PROPOFOL 100 MCG/KG/MIN: 10 INJECTION, EMULSION INTRAVENOUS at 12:40

## 2019-07-12 RX ADMIN — SODIUM CHLORIDE: 9 INJECTION, SOLUTION INTRAVENOUS at 12:23

## 2019-07-12 RX ADMIN — FENTANYL CITRATE 50 MCG: 50 INJECTION, SOLUTION INTRAMUSCULAR; INTRAVENOUS at 12:36

## 2019-07-12 RX ADMIN — CEFAZOLIN SODIUM 2 G: 2 INJECTION, SOLUTION INTRAVENOUS at 12:31

## 2019-07-12 RX ADMIN — SODIUM CHLORIDE: 9 INJECTION, SOLUTION INTRAVENOUS at 12:54

## 2019-07-12 RX ADMIN — MIDAZOLAM HYDROCHLORIDE 2 MG: 1 INJECTION, SOLUTION INTRAMUSCULAR; INTRAVENOUS at 12:36

## 2019-07-12 RX ADMIN — PROPOFOL 70 MG: 10 INJECTION, EMULSION INTRAVENOUS at 12:36

## 2019-07-12 ASSESSMENT — PULMONARY FUNCTION TESTS
PIF_VALUE: 1
PIF_VALUE: 4
PIF_VALUE: 1
PIF_VALUE: 1
PIF_VALUE: 12
PIF_VALUE: 1
PIF_VALUE: 0
PIF_VALUE: 1
PIF_VALUE: 0
PIF_VALUE: 1
PIF_VALUE: 0
PIF_VALUE: 0
PIF_VALUE: 1
PIF_VALUE: 11
PIF_VALUE: 0
PIF_VALUE: 1
PIF_VALUE: 12
PIF_VALUE: 1
PIF_VALUE: 1
PIF_VALUE: 2
PIF_VALUE: 1
PIF_VALUE: 0
PIF_VALUE: 11
PIF_VALUE: 0
PIF_VALUE: 1
PIF_VALUE: 1
PIF_VALUE: 13
PIF_VALUE: 1
PIF_VALUE: 0
PIF_VALUE: 1
PIF_VALUE: 1

## 2019-07-12 ASSESSMENT — PAIN - FUNCTIONAL ASSESSMENT: PAIN_FUNCTIONAL_ASSESSMENT: 0-10

## 2019-07-12 ASSESSMENT — PAIN DESCRIPTION - ORIENTATION
ORIENTATION: RIGHT
ORIENTATION: RIGHT

## 2019-07-12 ASSESSMENT — PAIN DESCRIPTION - PAIN TYPE
TYPE: SURGICAL PAIN
TYPE: SURGICAL PAIN

## 2019-07-12 ASSESSMENT — PAIN DESCRIPTION - LOCATION
LOCATION: KNEE
LOCATION: KNEE

## 2019-07-12 ASSESSMENT — PAIN SCALES - GENERAL
PAINLEVEL_OUTOF10: 3
PAINLEVEL_OUTOF10: 3

## 2019-07-12 ASSESSMENT — ENCOUNTER SYMPTOMS: SHORTNESS OF BREATH: 0

## 2019-07-12 NOTE — ANESTHESIA POSTPROCEDURE EVALUATION
[facility] Department of Anesthesiology  Post-Anesthesia Note       Name:  Katrina Mckeon                                  Age:  35 y.o. MRN:  [id number]     Last Vitals & Oxygen Saturation: /66   Pulse 86   Temp (P) 97.8 °F (36.6 °C) (Temporal)   Resp 16   Ht 5' 6\" (1.676 m)   Wt 194 lb 8 oz (88.2 kg)   SpO2 100%   BMI 31.39 kg/m²   Patient Vitals for the past 4 hrs:   BP Temp Temp src Pulse Resp SpO2 Height Weight   07/12/19 1330 106/66 (P) 97.8 °F (36.6 °C) (P) Temporal 86 16 100 % -- --   07/12/19 1325 113/77 -- -- 95 15 100 % -- --   07/12/19 1320 112/68 -- -- 94 14 99 % -- --   07/12/19 1316 116/82 97.2 °F (36.2 °C) Temporal 104 14 99 % -- --   07/12/19 1155 120/78 98.1 °F (36.7 °C) Temporal 89 16 100 % 5' 6\" (1.676 m) 194 lb 8 oz (88.2 kg)       Level of consciousness:  Awake, alert    Respiratory: Respirations easy, no distress. Stable. Cardiovascular: Hemodynamically stable. Hydration: Adequate. PONV: Adequately managed. Post-op pain: Adequately controlled. Post-op assessment: Tolerated anesthetic well without complication. Complications:  None.     Ashley Sykes MD  July 12, 2019   1:48 PM

## 2019-07-12 NOTE — TELEPHONE ENCOUNTER
Auth: # [id number]    Date: 7/12/19  Type of SX:  Outpatient  Location: [facility]  CPT: 01287   SX area: Rt knee

## 2019-07-12 NOTE — OP NOTE
there is any concerns or problems, an appointment for follow-up.                         _____________________         Josefina Mckinley.  MS Denzel, DO         Orthopedic Surgeon          Orth[facility]ics Sports [facility] trained         Board-certified         Team Physician for [employer]

## 2019-07-18 ENCOUNTER — OFFICE VISIT (OUTPATIENT)
Dept: ORTHOPEDIC SURGERY | Age: 34
End: 2019-07-18

## 2019-07-18 VITALS — WEIGHT: 184 LBS | BODY MASS INDEX: 30.66 KG/M2 | HEIGHT: 65 IN

## 2019-07-18 DIAGNOSIS — M25.561 ACUTE PAIN OF RIGHT KNEE: Primary | ICD-10-CM

## 2019-07-18 PROCEDURE — 99024 POSTOP FOLLOW-UP VISIT: CPT | Performed by: ORTHOPAEDIC SURGERY

## 2019-07-19 ENCOUNTER — TELEPHONE (OUTPATIENT)
Dept: ORTHOPEDIC SURGERY | Age: 34
End: 2019-07-19

## 2019-07-19 NOTE — TELEPHONE ENCOUNTER
FAXED AN ASSOCIATE'S AUTHORIZATION TO COMPLETE QUESTIONNAIRE TO [facility] FOR [place] TO .  CALLED PATIENT TO LET THEM KNOW IT IS READY

## 2019-07-26 ENCOUNTER — HOSPITAL ENCOUNTER (OUTPATIENT)
Dept: PHYSICAL THERAPY | Age: 34
Setting detail: THERAPIES SERIES
Discharge: HOME | End: 2019-07-26
Payer: COMMERCIAL

## 2019-07-26 PROCEDURE — 97161 PT EVAL LOW COMPLEX 20 MIN: CPT

## 2019-07-26 PROCEDURE — 97140 MANUAL THERAPY 1/> REGIONS: CPT

## 2019-07-26 PROCEDURE — 97016 VASOPNEUMATIC DEVICE THERAPY: CPT

## 2019-07-26 PROCEDURE — 97110 THERAPEUTIC EXERCISES: CPT

## 2019-07-26 NOTE — PLAN OF CARE
[x] Patient history, allergies, meds reviewed. Medical chart reviewed. See intake form. Review Of Systems (ROS):  [x]Performed Review of systems (Integumentary, CardioPulmonary, Neurological) by intake and observation. Intake form has been scanned into medical record. Patient has been instructed to contact their primary care physician regarding ROS issues if not already being addressed at this time. Co-morbidities/Complexities (which will affect course of rehabilitation):   [x]None           Arthritic conditions   []Rheumatoid arthritis (M05.9)  []Osteoarthritis (M19.91)   Cardiovascular conditions   []Hypertension (I10)  []Hyperlipidemia (E78.5)  []Angina pectoris (I20)  []Atherosclerosis (I70)   Musculoskeletal conditions   []Disc pathology   []Congenital spine pathologies   []Prior surgical intervention  []Osteoporosis (M81.8)  []Osteopenia (M85.8)   Endocrine conditions   []Hypothyroid (E03.9)  []Hyperthyroid Gastrointestinal conditions   []Constipation (E95.19)   Metabolic conditions   []Morbid obesity (E66.01)  []Diabetes type 1(E10.65) or 2 (E11.65)   []Neuropathy (G60.9)     Pulmonary conditions   []Asthma (J45)  []Coughing   []COPD (J44.9)   Psychological Disorders  []Anxiety (F41.9)  []Depression (F32.9)   []Other:   []Other:          Barriers to/and or personal factors that will affect rehab potential:              []Age  []Sex              []Motivation/Lack of Motivation                        []Co-Morbidities              []Cognitive Function, education/learning barriers              []Environmental, home barriers              [x]profession/work barriers  []past PT/medical experience  []other:  Justification:     Falls Risk Assessment (30 days):   [x] Falls Risk assessed and no intervention required.   [] Falls Risk assessed and Patient requires intervention due to being higher risk   TUG score (>12s at risk):     [] Falls education provided, including       G-Codes:   LEFS = 50% disability     ASSESSMENT:   Functional Impairments:     [x]Noted lumbar/proximal hip/LE joint hypomobility   [x]Decreased LE functional ROM   []Decreased core/proximal hip strength and neuromuscular control   [x]Decreased LE functional strength   [x]Reduced balance/proprioceptive control   []other:      Functional Activity Limitations (from functional questionnaire and intake)   []Reduced ability to tolerate prolonged functional positions   []Reduced ability or difficulty with changes of positions or transfers between positions   [x]Reduced ability to maintain good posture and demonstrate good body mechanics with sitting, bending, and lifting   [x]Reduced ability to sleep   [] Reduced ability or tolerance with driving and/or computer work   []Reduced ability to perform lifting, carrying tasks   [x]Reduced ability to squat   []Reduced ability to forward bend   [x]Reduced ability to ambulate prolonged functional periods/distances/surfaces   [x]Reduced ability to ascend/descend stairs   [x]Reduced ability to run, hop, cut or jump   []other:    Participation Restrictions   []Reduced participation in self care activities   []Reduced participation in home management activities   [x]Reduced participation in work activities   [x]Reduced participation in social activities. []Reduced participation in sport/recreation activities. Classification :    [x]Signs/symptoms consistent with post-surgical status including decreased ROM, strength and function.    []Signs/symptoms consistent with joint sprain/strain   []Signs/symptoms consistent with patella-femoral syndrome   []Signs/symptoms consistent with knee OA/hip OA   []Signs/symptoms consistent with internal derangement of knee/Hip   []Signs/symptoms consistent with functional hip weakness/NMR control      []Signs/symptoms consistent with tendinitis/tendinosis    []signs/symptoms consistent with pathology which may benefit from Dry needling      []other: Prognosis/Rehab Potential:      []Excellent   [x]Good    []Fair   []Poor    Tolerance of evaluation/treatment:    []Excellent   [x]Good    []Fair   []Poor    Physical Therapy Evaluation Complexity Justification  [x] A history of present problem with:  [x] no personal factors and/or comorbidities that impact the plan of care;  []1-2 personal factors and/or comorbidities that impact the plan of care  []3 personal factors and/or comorbidities that impact the plan of care  [x] An examination of body systems using standardized tests and measures addressing any of the following: body structures and functions (impairments), activity limitations, and/or participation restrictions;:  [x] a total of 1-2 or more elements   [] a total of 3 or more elements   [] a total of 4 or more elements   [x] A clinical presentation with:  [x] stable and/or uncomplicated characteristics   [] evolving clinical presentation with changing characteristics  [] unstable and unpredictable characteristics;   [x] Clinical decision making of [x] low, [] moderate, [] high complexity using standardized patient assessment instrument and/or measurable assessment of functional outcome. [x] EVAL (LOW) 12403 (typically 30 minutes face-to-face)  [] EVAL (MOD) 93889 (typically 30 minutes face-to-face)  [] EVAL (HIGH) 34830 (typically 45 minutes face-to-face)  [] RE-EVAL     PLAN:   Frequency/Duration:  2 days per week for 4-6 Weeks:  Interventions:  [x]  Therapeutic exercise including: strength training, ROM, for Lower extremity and core   [x]  NMR activation and proprioception for LE, Glutes and Core   [x]  Manual therapy as indicated for LE, Hip and spine to include: Dry Needling/IASTM, STM, PROM, Gr I-IV mobilizations, manipulation.    [x] Modalities as needed that may include: thermal agents, E-stim, Biofeedback, US, iontophoresis as indicated  [x] Patient education on joint protection, postural re-education, activity modification, progression of HEP.    HEP instruction: Patient instructed in, and demonstrated proper form of, exercises.  Copy of exercises scanned into media file. GOALS:  Patient stated goal: \"To get back to full duty at work and to get back to hiking\"     Therapist goals for Patient:   Short Term Goals: To be achieved in: 2 weeks  1. Independent in HEP and progression per patient tolerance, in order to prevent re-injury. 2. Patient will have a decrease in pain to facilitate improvement in movement, function, and ADLs as indicated by Functional Deficits. Long Term Goals: To be achieved in: 4-6 weeks  1. Disability index score of 35% or less for the LEFS to assist with reaching prior level of function. 2. Patient will demonstrate increased AROM to WNL for R knee to allow for proper joint functioning as indicated by patients Functional Deficits. 3. Patient will demonstrate an increase in Strength to good proximal hip strength and control, within 5lb HHD in LE to allow for proper functional mobility as indicated by patients Functional Deficits. 4. Patient will return to standing for 1 hour without increased symptoms or restriction. 5. Patient will be able to walk up and down 1 flight of stairs without increased symptoms or restriction.         Electronically signed by:  Inna Peralta, PT

## 2019-08-02 ENCOUNTER — HOSPITAL ENCOUNTER (OUTPATIENT)
Dept: PHYSICAL THERAPY | Age: 34
Setting detail: THERAPIES SERIES
Discharge: HOME | End: 2019-08-02
Payer: COMMERCIAL

## 2019-08-02 PROCEDURE — 97140 MANUAL THERAPY 1/> REGIONS: CPT | Performed by: SPECIALIST/TECHNOLOGIST

## 2019-08-02 PROCEDURE — 97110 THERAPEUTIC EXERCISES: CPT | Performed by: SPECIALIST/TECHNOLOGIST

## 2019-08-02 PROCEDURE — 97016 VASOPNEUMATIC DEVICE THERAPY: CPT | Performed by: SPECIALIST/TECHNOLOGIST

## 2019-08-06 ENCOUNTER — HOSPITAL ENCOUNTER (OUTPATIENT)
Dept: PHYSICAL THERAPY | Age: 34
Setting detail: THERAPIES SERIES
Discharge: HOME | End: 2019-08-06
Payer: COMMERCIAL

## 2019-08-06 PROCEDURE — 97110 THERAPEUTIC EXERCISES: CPT | Performed by: SPECIALIST/TECHNOLOGIST

## 2019-08-06 PROCEDURE — 97140 MANUAL THERAPY 1/> REGIONS: CPT | Performed by: SPECIALIST/TECHNOLOGIST

## 2019-08-09 ENCOUNTER — APPOINTMENT (OUTPATIENT)
Dept: PHYSICAL THERAPY | Age: 34
End: 2019-08-09
Payer: COMMERCIAL

## 2019-08-09 ENCOUNTER — HOSPITAL ENCOUNTER (OUTPATIENT)
Dept: PHYSICAL THERAPY | Age: 34
Setting detail: THERAPIES SERIES
Discharge: HOME | End: 2019-08-09
Payer: COMMERCIAL

## 2019-08-09 PROCEDURE — 97110 THERAPEUTIC EXERCISES: CPT

## 2019-08-09 PROCEDURE — 97140 MANUAL THERAPY 1/> REGIONS: CPT

## 2019-08-09 NOTE — FLOWSHEET NOTE
2. Patient will demonstrate increased AROM to WNL for R knee to allow for proper joint functioning as indicated by patients Functional Deficits. 3. Patient will demonstrate an increase in Strength to good proximal hip strength and control, within 5lb HHD in LE to allow for proper functional mobility as indicated by patients Functional Deficits. 4. Patient will return to standing for 1 hour without increased symptoms or restriction. 5. Patient will be able to walk up and down 1 flight of stairs without increased symptoms or restriction. Progression Towards Functional goals:  [] Patient is progressing as expected towards functional goals listed. [] Progression is slowed due to complexities listed. [] Progression has been slowed due to co-morbidities. [x] Plan just implemented, too soon to assess goals progression  [] Other:     ASSESSMENT:  Pt was lacking terminal knee extension at beginning of session today, but improved after manual therapy and knee extension mobs. Held weight with SLR flexion due to increased patellar pain with weight. Pt tolerated addition of SLR abduction, bridges, CRICKET abduction and TRX squats well with fatigue noted, but no increased knee pain. Mild stiffness noted at end range knee flexion ROM. Treatment/Activity Tolerance:  [x] Patient tolerated treatment well [] Patient limited by fatique  [] Patient limited by pain  [] Patient limited by other medical complications  [] Other:     Prognosis: [x] Good [] Fair  [] Poor    Patient Requires Follow-up: [x] Yes  [] No    PLAN: Pt advised to use compression dressing to control swelling about he right knee. Advised to avoid standing in locked knee positions and maintain knee ROM while monitoring swelling.    [x] Continue per plan of care [] Alter current plan (see comments)  [] Plan of care initiated [] Hold pending MD visit [] Discharge    Electronically signed by: Bob Shook PT

## 2019-08-13 ENCOUNTER — HOSPITAL ENCOUNTER (OUTPATIENT)
Dept: PHYSICAL THERAPY | Age: 34
Setting detail: THERAPIES SERIES
Discharge: HOME | End: 2019-08-13
Payer: COMMERCIAL

## 2019-08-13 PROCEDURE — 97140 MANUAL THERAPY 1/> REGIONS: CPT | Performed by: SPECIALIST/TECHNOLOGIST

## 2019-08-13 PROCEDURE — 97110 THERAPEUTIC EXERCISES: CPT | Performed by: SPECIALIST/TECHNOLOGIST

## 2019-08-16 ENCOUNTER — HOSPITAL ENCOUNTER (OUTPATIENT)
Dept: PHYSICAL THERAPY | Age: 34
Setting detail: THERAPIES SERIES
Discharge: HOME | End: 2019-08-16
Payer: COMMERCIAL

## 2019-08-16 PROCEDURE — 97110 THERAPEUTIC EXERCISES: CPT

## 2019-08-16 PROCEDURE — 97140 MANUAL THERAPY 1/> REGIONS: CPT

## 2019-08-16 PROCEDURE — 97016 VASOPNEUMATIC DEVICE THERAPY: CPT

## 2019-08-22 ENCOUNTER — OFFICE VISIT (OUTPATIENT)
Dept: ORTHOPEDIC SURGERY | Age: 34
End: 2019-08-22

## 2019-08-22 VITALS — BODY MASS INDEX: 30.67 KG/M2 | WEIGHT: 184.08 LBS | HEIGHT: 65 IN

## 2019-08-22 DIAGNOSIS — M17.12 PATELLOFEMORAL ARTHRITIS OF LEFT KNEE: ICD-10-CM

## 2019-08-22 DIAGNOSIS — M25.561 ACUTE PAIN OF RIGHT KNEE: Primary | ICD-10-CM

## 2019-08-22 PROCEDURE — 99024 POSTOP FOLLOW-UP VISIT: CPT | Performed by: ORTHOPAEDIC SURGERY

## 2019-08-22 NOTE — PROGRESS NOTES
HISTORY OF PRESENT ILLNESS:   S/P Knee Arthroscopy  The Patient returns today for their postoperative visit after 7 weeks knee arthroscopy. Pain control has been satisfactory with oral medications. There have been no fevers or chills. PHYSICAL EXAMINATION: Right knee   Inspection reveals expected swelling. Portal sites are clean and dry. The skin is warm. Range of motion is limited by pain and swelling. There is no calf pain  Homans sign is negative. Examination of the contralateral knee reveals warm skin, range of motion within normal limits, good quadriceps bulk, tone and strength, no tenderness to palpation, stable cruciate and collateral ligaments, and no joint line tenderness. Examination of the Patients Lumbar spine revealsThe skin is warm and dry. There is no swelling, warmth, or erythema. Range of motion is within normal limits. There is no paraspinal or spinous process tenderness. Ipsilateral and contralateral straight leg raising tests are negative. The distal neurovascular exam is grossly intact and symmetric. ASSESSMENT/PLAN:   The patient is doing well after knee arthroscopy. I have recommended ice,judicious use of NSAIDs, and physical therapy to diminish swelling and restore both motion and strength. I cautioned against overusing the knee, and they will schedule a reevaluation in 6 to 8 weeks  They verbalized good understanding of the plan. Disclaimer: \"This note was dictated with voice recognition software. Though review and correction are routine, we apologize for any errors. \"

## 2019-09-04 ENCOUNTER — HOSPITAL ENCOUNTER (OUTPATIENT)
Dept: PHYSICAL THERAPY | Age: 34
Setting detail: THERAPIES SERIES
Discharge: HOME | End: 2019-09-04
Payer: COMMERCIAL

## 2019-09-04 PROCEDURE — 97110 THERAPEUTIC EXERCISES: CPT | Performed by: SPECIALIST/TECHNOLOGIST

## 2019-09-04 NOTE — FLOWSHEET NOTE
[facility]    Physical Therapy Daily Treatment Note  Date:  2019    Patient Name:  Memo White  \"Blaire\"  :  1985  MRN: [id number]  Restrictions/Precautions:    Medical/Treatment Diagnosis Information:  · Diagnosis: Acute pain of right knee (M25.561), s/p R knee arthroscopy, chondroplasty, synovectomy, lateral release 19  · Treatment Diagnosis: Acute right knee pain   Insurance/Certification information:  PT Insurance Information: [employer] - $3000 deductible, $4500 OOP max, $0 co-pay, 80%/20% co-insurance, 60 PT visits per calendar year (2 previously used)   Physician Information:  Referring Practitioner: Dr. Soto Lantigua of care signed (Y/N):     Date of Patient follow up with Physician: Ninoska Agarwal 10/10    G-Code (if applicable):      Date G-Code Applied:     LEFS = 50% disability     Progress Note: [x]  Yes  []  No  Next due by: Visit #10       Latex Allergy:  [x]NO      []YES  Preferred Language for Healthcare:   [x]English       []other:    Visit # Insurance Allowable   6 58     Pain Level: 2/10 stiffness more than pain     SUBJECTIVE:  7 weeks s/p  Rt knee is doing ok but has some increased stiffness with prolonged sitting. Job demands going ok will be transitioning to seated job soon . Pt. Reports having increased pain below the patella especially when the knee is straight. Attempting to stand on a pad as above. OBJECTIVE:   Observation:   19 RT Knee Quad contraction-- fair    prominent portals on patella. Rt knee -3- WNL.  General swelling over the lateral knee/ infrapatellar area  19 RT knee 0- WNL has excellent ROM when attempting to perform quad stretch  Test measurements:      RESTRICTIONS/PRECAUTIONS: S/p R knee arthroscopy, chondroplasty of patella, synovectomy, lateral release 19; L knee pain   Exercises/Interventions:  39'  Therapeutic Ex Resistance Sets/sec Reps Notes   BIKE   6'    SLR + seated with ambulation re-education including up and down stairs     Home Exercise Program:    [x] (10617) Reviewed/Progressed HEP activities related to strengthening, flexibility, endurance, ROM of core, proximal hip and LE for functional self-care, mobility, lifting and ambulation/stair navigation   [] (25976)Reviewed/Progressed HEP activities related to improving balance, coordination, kinesthetic sense, posture, motor skill, proprioception of core, proximal hip and LE for self care, mobility, lifting, and ambulation/stair navigation      Manual Treatments:  PROM / STM / Oscillations-Mobs:  G-I, II, III, IV (PA's, Inf., Post.)  [x] (82521) Provided manual therapy to mobilize LE, proximal hip and/or LS spine soft tissue/joints for the purpose of modulating pain, promoting relaxation,  increasing ROM, reducing/eliminating soft tissue swelling/inflammation/restriction, improving soft tissue extensibility and allowing for proper ROM for normal function with self care, mobility, lifting and ambulation. Modalities:  15' CP    Charges:  Timed Code Treatment Minutes: 50   Total Treatment Minutes: 65     [] EVAL  [x] LE(52273) x  3   [] IONTO  [] NMR (60579) x      [x] VASO  [] Manual (51991) x       [] Other:  [] TA x       [] Mech Traction (10040)  [] ES(attended) (22302)      [] ES (un) (63107):     GOALS:   Patient stated goal: \"To get back to full duty at work and to get back to hiking\"      Therapist goals for Patient:   Short Term Goals: To be achieved in: 2 weeks  1. Independent in HEP and progression per patient tolerance, in order to prevent re-injury. 2. Patient will have a decrease in pain to facilitate improvement in movement, function, and ADLs as indicated by Functional Deficits.     Long Term Goals: To be achieved in: 4-6 weeks  1. Disability index score of 35% or less for the LEFS to assist with reaching prior level of function.    2. Patient will demonstrate increased AROM to WNL for R knee to allow for

## 2019-09-10 ENCOUNTER — HOSPITAL ENCOUNTER (OUTPATIENT)
Dept: PHYSICAL THERAPY | Age: 34
Setting detail: THERAPIES SERIES
Discharge: HOME | End: 2019-09-10
Payer: COMMERCIAL

## 2019-09-10 PROCEDURE — 97016 VASOPNEUMATIC DEVICE THERAPY: CPT | Performed by: SPECIALIST/TECHNOLOGIST

## 2019-09-10 PROCEDURE — 97110 THERAPEUTIC EXERCISES: CPT | Performed by: SPECIALIST/TECHNOLOGIST

## 2019-09-10 NOTE — FLOWSHEET NOTE
flexibility, endurance, ROM of core, proximal hip and LE for functional self-care, mobility, lifting and ambulation/stair navigation   [] (76187)Reviewed/Progressed HEP activities related to improving balance, coordination, kinesthetic sense, posture, motor skill, proprioception of core, proximal hip and LE for self care, mobility, lifting, and ambulation/stair navigation      Manual Treatments:  PROM / STM / Oscillations-Mobs:  G-I, II, III, IV (PA's, Inf., Post.)  [x] (56647) Provided manual therapy to mobilize LE, proximal hip and/or LS spine soft tissue/joints for the purpose of modulating pain, promoting relaxation,  increasing ROM, reducing/eliminating soft tissue swelling/inflammation/restriction, improving soft tissue extensibility and allowing for proper ROM for normal function with self care, mobility, lifting and ambulation. Modalities:  15' CP    Charges:  Timed Code Treatment Minutes: 50   Total Treatment Minutes: 65     [] EVAL  [x] RA(16129) x  3   [] IONTO  [] NMR (65246) x      [x] VASO  [] Manual (99519) x       [] Other:  [] TA x       [] Mech Traction (47946)  [] ES(attended) (36007)      [] ES (un) (44825):     GOALS:   Patient stated goal: \"To get back to full duty at work and to get back to hiking\"      Therapist goals for Patient:   Short Term Goals: To be achieved in: 2 weeks  1. Independent in HEP and progression per patient tolerance, in order to prevent re-injury. 2. Patient will have a decrease in pain to facilitate improvement in movement, function, and ADLs as indicated by Functional Deficits.     Long Term Goals: To be achieved in: 4-6 weeks  1. Disability index score of 35% or less for the LEFS to assist with reaching prior level of function. 2. Patient will demonstrate increased AROM to WNL for R knee to allow for proper joint functioning as indicated by patients Functional Deficits.    3. Patient will demonstrate an increase in Strength to good proximal hip strength and

## 2019-09-12 ENCOUNTER — APPOINTMENT (OUTPATIENT)
Dept: PHYSICAL THERAPY | Age: 34
End: 2019-09-12
Payer: COMMERCIAL

## 2019-09-13 ENCOUNTER — HOSPITAL ENCOUNTER (OUTPATIENT)
Dept: PHYSICAL THERAPY | Age: 34
Setting detail: THERAPIES SERIES
Discharge: HOME | End: 2019-09-13
Payer: COMMERCIAL

## 2019-09-13 PROCEDURE — 97110 THERAPEUTIC EXERCISES: CPT | Performed by: SPECIALIST/TECHNOLOGIST

## 2019-09-13 PROCEDURE — 97112 NEUROMUSCULAR REEDUCATION: CPT | Performed by: SPECIALIST/TECHNOLOGIST

## 2019-09-13 PROCEDURE — 97140 MANUAL THERAPY 1/> REGIONS: CPT | Performed by: SPECIALIST/TECHNOLOGIST

## 2019-09-25 ENCOUNTER — HOSPITAL ENCOUNTER (OUTPATIENT)
Dept: PHYSICAL THERAPY | Age: 34
Setting detail: THERAPIES SERIES
Discharge: HOME | End: 2019-09-25
Payer: COMMERCIAL

## 2019-09-25 PROCEDURE — 97112 NEUROMUSCULAR REEDUCATION: CPT | Performed by: SPECIALIST/TECHNOLOGIST

## 2019-09-25 PROCEDURE — 97016 VASOPNEUMATIC DEVICE THERAPY: CPT | Performed by: SPECIALIST/TECHNOLOGIST

## 2019-09-25 PROCEDURE — 97110 THERAPEUTIC EXERCISES: CPT | Performed by: SPECIALIST/TECHNOLOGIST

## 2019-10-02 ENCOUNTER — HOSPITAL ENCOUNTER (OUTPATIENT)
Dept: PHYSICAL THERAPY | Age: 34
Setting detail: THERAPIES SERIES
Discharge: HOME | End: 2019-10-02
Payer: COMMERCIAL

## 2019-10-02 PROCEDURE — 97140 MANUAL THERAPY 1/> REGIONS: CPT | Performed by: SPECIALIST/TECHNOLOGIST

## 2019-10-02 PROCEDURE — 97110 THERAPEUTIC EXERCISES: CPT | Performed by: SPECIALIST/TECHNOLOGIST

## 2019-10-02 PROCEDURE — 97112 NEUROMUSCULAR REEDUCATION: CPT | Performed by: SPECIALIST/TECHNOLOGIST

## 2019-10-10 ENCOUNTER — OFFICE VISIT (OUTPATIENT)
Dept: ORTHOPEDIC SURGERY | Age: 34
End: 2019-10-10

## 2019-10-10 VITALS — BODY MASS INDEX: 30.67 KG/M2 | WEIGHT: 184.08 LBS | HEIGHT: 65 IN

## 2019-10-10 DIAGNOSIS — M17.12 PATELLOFEMORAL ARTHRITIS OF LEFT KNEE: ICD-10-CM

## 2019-10-10 DIAGNOSIS — M25.561 ACUTE PAIN OF RIGHT KNEE: Primary | ICD-10-CM

## 2019-10-10 PROCEDURE — 99024 POSTOP FOLLOW-UP VISIT: CPT | Performed by: ORTHOPAEDIC SURGERY

## 2020-03-05 ENCOUNTER — OFFICE VISIT (OUTPATIENT)
Dept: ORTHOPEDIC SURGERY | Age: 35
End: 2020-03-05
Payer: COMMERCIAL

## 2020-03-05 VITALS — BODY MASS INDEX: 30.67 KG/M2 | WEIGHT: 184.08 LBS | HEIGHT: 65 IN

## 2020-03-05 PROCEDURE — 99214 OFFICE O/P EST MOD 30 MIN: CPT | Performed by: ORTHOPAEDIC SURGERY

## 2020-03-05 NOTE — PROGRESS NOTES
deformity no obvious  swelling and joint effusion     Palpation:   Palpation reveals no pain medial joint line,   No lateral joint line pain, no joint effusion    Range of Motion: 0- 150 flexion/ extension   Hip extension to 20 hip flexion to 70+  Lumbar ROM -20 extension flexion to 6 inches from the floor      Strength: Quadriceps testing 5/5 , hamstring muscle testing 5/5, EHL against resistance is 5/5, hip flexor strength is intact 5/5, internal and external rotation of the hip against resistance is also intact 5/5    Special Tests: stable Lachman, negative anterior drawer, negative pivot shift, no posterior sag no posterior drawer does not open to valgus or varus stress at 0 or 30°, Steinmann's negative, Gil's negative, Homans negative Oumar negative, pedal pulses are +2/4 capillary refill is brisk sensation is intact ankle exam and hip exam are shows no pain with full range of motion provocative testing of the hip is nonpainful muscle testing around the hip is 5 over 5. Lumbar flexion to 6 inches from floor with out pain      Inspection:      Gait: antalgic     Reflex:    Lower extremity Deep tendon reflexes +2/4 and equal bilaterally for patella and Achilles  Upper extremity reflexes:  of the biceps, triceps, brachioradialis +2/4 equal bilaterally    Contralateral  Knee: Negative Lachman negative anterior drawer negative pivot shift no posterior sag no posterior drawer does not open to valgus or varus stress at 0 or 30° negative Steinmann's negative Gil's negative Homans negative Oumar pedal pulses are +2/4 capillary refill is brisk sensation is intact ankle exam and hip exam are shows no pain with full range of motion provocative testing of the hip is nonpainful muscle testing around the hip is 5 over 5. Hip and lumbar testing does not reproduce pain evocative testing does not reproduce symptomatology.           Additional Examinations:  Right Upper Extremity:  Examination of the right upper

## 2022-11-08 NOTE — FLOWSHEET NOTE
Stable, continue present management lifting and ambulation/stair navigation   [] (99033)Reviewed/Progressed HEP activities related to improving balance, coordination, kinesthetic sense, posture, motor skill, proprioception of core, proximal hip and LE for self care, mobility, lifting, and ambulation/stair navigation      Manual Treatments:  PROM / STM / Oscillations-Mobs:  G-I, II, III, IV (PA's, Inf., Post.)  [x] (10252) Provided manual therapy to mobilize LE, proximal hip and/or LS spine soft tissue/joints for the purpose of modulating pain, promoting relaxation,  increasing ROM, reducing/eliminating soft tissue swelling/inflammation/restriction, improving soft tissue extensibility and allowing for proper ROM for normal function with self care, mobility, lifting and ambulation. Modalities:  15' CP    Charges:  Timed Code Treatment Minutes: 55   Total Treatment Minutes: 70     [] EVAL  [x] FJ(06571) x  3   [] IONTO  [] NMR (16554) x      [] VASO  [x] Manual (12780) x  1    [] Other:  [] TA x       [] Mech Traction (35178)  [] ES(attended) (38886)      [] ES (un) (16138):     GOALS:   Patient stated goal: \"To get back to full duty at work and to get back to hiking\"      Therapist goals for Patient:   Short Term Goals: To be achieved in: 2 weeks  1. Independent in HEP and progression per patient tolerance, in order to prevent re-injury. 2. Patient will have a decrease in pain to facilitate improvement in movement, function, and ADLs as indicated by Functional Deficits.     Long Term Goals: To be achieved in: 4-6 weeks  1. Disability index score of 35% or less for the LEFS to assist with reaching prior level of function. 2. Patient will demonstrate increased AROM to WNL for R knee to allow for proper joint functioning as indicated by patients Functional Deficits.    3. Patient will demonstrate an increase in Strength to good proximal hip strength and control, within 5lb HHD in LE to allow for proper functional mobility as indicated by patients

## (undated) DEVICE — GLOVE ORANGE PI 8 1/2   MSG9085

## (undated) DEVICE — FLEXIBLE ADHESIVE BANDAGE: Brand: CURITY

## (undated) DEVICE — GAUZE,SPONGE,4"X4",8PLY,STRL,LF,10/TRAY: Brand: MEDLINE

## (undated) DEVICE — 3M™ STERI-DRAPE™ U-DRAPE 1015: Brand: STERI-DRAPE™

## (undated) DEVICE — WEREWOLF FLOW 50 COBLATION WAND: Brand: COBLATION

## (undated) DEVICE — SUTURE ETHLN SZ 4-0 L18IN NONABSORBABLE BLK L19MM PS-2 3/8 1667H

## (undated) DEVICE — HYPODERMIC SAFETY NEEDLE: Brand: MAGELLAN

## (undated) DEVICE — GLOVE SURG SZ 9 THK91MIL LTX FREE SYN POLYISOPRENE ANTI

## (undated) DEVICE — SYRINGE MED 50ML LUERLOCK TIP

## (undated) DEVICE — GOWN SIRUS NONREIN XL W/TWL: Brand: MEDLINE INDUSTRIES, INC.

## (undated) DEVICE — ROOM TURNOVER KIT W/ ARM STRP

## (undated) DEVICE — APPLICATOR PREP 26ML 0.7% IOD POVACRYLEX 74% ISO ALC ST

## (undated) DEVICE — GOWN,AURORA,NONREINF,RAGLAN,XXL,STERILE: Brand: MEDLINE

## (undated) DEVICE — DYONICS 25 PATIENT TUBE SET MUST                                    BE USED WITH 7211007, 12 PER BOX

## (undated) DEVICE — SHEET,DRAPE,53X77,STERILE: Brand: MEDLINE

## (undated) DEVICE — Device

## (undated) DEVICE — 3.5 MM FULL RADIUS ELITE STRAIGHT                                    DISPOSABLE BLADES, BEIGE,PACKAGED 6                                    PER BOX, STERILE